# Patient Record
Sex: FEMALE | Race: BLACK OR AFRICAN AMERICAN | Employment: FULL TIME | URBAN - METROPOLITAN AREA
[De-identification: names, ages, dates, MRNs, and addresses within clinical notes are randomized per-mention and may not be internally consistent; named-entity substitution may affect disease eponyms.]

---

## 2020-02-26 ENCOUNTER — HOSPITAL ENCOUNTER (OUTPATIENT)
Dept: GENERAL RADIOLOGY | Age: 40
Discharge: HOME | End: 2020-02-26
Attending: OBSTETRICS & GYNECOLOGY
Payer: COMMERCIAL

## 2020-02-26 DIAGNOSIS — N92.6 IRREGULAR MENSTRUAL CYCLE: ICD-10-CM

## 2020-02-26 PROCEDURE — 74011636320 HC RX REV CODE- 636/320: Performed by: OBSTETRICS & GYNECOLOGY

## 2020-02-26 PROCEDURE — 58340 CATHETER FOR HYSTEROGRAPHY: CPT

## 2020-02-26 RX ADMIN — IOPAMIDOL 25 ML: 612 INJECTION, SOLUTION INTRAVENOUS at 09:57

## 2021-09-05 ENCOUNTER — OUT OF OFFICE VISIT (OUTPATIENT)
Dept: URBAN - METROPOLITAN AREA MEDICAL CENTER 28 | Facility: MEDICAL CENTER | Age: 41
End: 2021-09-05
Payer: COMMERCIAL

## 2021-09-05 DIAGNOSIS — K51.80 CHRONIC PANCOLONIC ULCERATIVE COLITIS: ICD-10-CM

## 2021-09-05 DIAGNOSIS — A04.0 ENTERITIS, ENTEROPATHOGENIC E. COLI: ICD-10-CM

## 2021-09-05 PROCEDURE — 99232 SBSQ HOSP IP/OBS MODERATE 35: CPT | Performed by: INTERNAL MEDICINE

## 2021-09-05 PROCEDURE — 99254 IP/OBS CNSLTJ NEW/EST MOD 60: CPT | Performed by: INTERNAL MEDICINE

## 2021-09-07 ENCOUNTER — OUT OF OFFICE VISIT (OUTPATIENT)
Dept: URBAN - METROPOLITAN AREA MEDICAL CENTER 28 | Facility: MEDICAL CENTER | Age: 41
End: 2021-09-07
Payer: COMMERCIAL

## 2021-09-07 DIAGNOSIS — A04.0 ENTERITIS, ENTEROPATHOGENIC E. COLI: ICD-10-CM

## 2021-09-07 DIAGNOSIS — K51.80 CHRONIC PANCOLONIC ULCERATIVE COLITIS: ICD-10-CM

## 2021-09-07 PROCEDURE — 99232 SBSQ HOSP IP/OBS MODERATE 35: CPT | Performed by: INTERNAL MEDICINE

## 2021-09-08 ENCOUNTER — OUT OF OFFICE VISIT (OUTPATIENT)
Dept: URBAN - METROPOLITAN AREA MEDICAL CENTER 28 | Facility: MEDICAL CENTER | Age: 41
End: 2021-09-08
Payer: COMMERCIAL

## 2021-09-08 DIAGNOSIS — K51.811 OTHER ULCERATIVE COLITIS WITH RECTAL BLEEDING: ICD-10-CM

## 2021-09-08 PROCEDURE — 45331 SIGMOIDOSCOPY AND BIOPSY: CPT | Performed by: INTERNAL MEDICINE

## 2021-09-09 ENCOUNTER — OUT OF OFFICE VISIT (OUTPATIENT)
Dept: URBAN - METROPOLITAN AREA MEDICAL CENTER 28 | Facility: MEDICAL CENTER | Age: 41
End: 2021-09-09
Payer: COMMERCIAL

## 2021-09-09 DIAGNOSIS — K51.80 CHRONIC PANCOLONIC ULCERATIVE COLITIS: ICD-10-CM

## 2021-09-09 DIAGNOSIS — A04.0 ENTERITIS, ENTEROPATHOGENIC E. COLI: ICD-10-CM

## 2021-09-09 PROCEDURE — 99232 SBSQ HOSP IP/OBS MODERATE 35: CPT | Performed by: INTERNAL MEDICINE

## 2021-09-11 ENCOUNTER — OUT OF OFFICE VISIT (OUTPATIENT)
Dept: URBAN - METROPOLITAN AREA MEDICAL CENTER 28 | Facility: MEDICAL CENTER | Age: 41
End: 2021-09-11
Payer: COMMERCIAL

## 2021-09-11 DIAGNOSIS — K51.80 CHRONIC PANCOLONIC ULCERATIVE COLITIS: ICD-10-CM

## 2021-09-11 DIAGNOSIS — A04.0 ENTERITIS, ENTEROPATHOGENIC E. COLI: ICD-10-CM

## 2021-09-11 PROCEDURE — 99232 SBSQ HOSP IP/OBS MODERATE 35: CPT | Performed by: INTERNAL MEDICINE

## 2021-09-14 ENCOUNTER — OUT OF OFFICE VISIT (OUTPATIENT)
Dept: URBAN - METROPOLITAN AREA MEDICAL CENTER 28 | Facility: MEDICAL CENTER | Age: 41
End: 2021-09-14
Payer: COMMERCIAL

## 2021-09-14 DIAGNOSIS — K51.80 CHRONIC PANCOLONIC ULCERATIVE COLITIS: ICD-10-CM

## 2021-09-14 DIAGNOSIS — A04.0 ENTERITIS, ENTEROPATHOGENIC E. COLI: ICD-10-CM

## 2021-09-14 PROCEDURE — 99232 SBSQ HOSP IP/OBS MODERATE 35: CPT | Performed by: INTERNAL MEDICINE

## 2021-09-16 ENCOUNTER — OUT OF OFFICE VISIT (OUTPATIENT)
Dept: URBAN - METROPOLITAN AREA MEDICAL CENTER 28 | Facility: MEDICAL CENTER | Age: 41
End: 2021-09-16
Payer: COMMERCIAL

## 2021-09-16 DIAGNOSIS — R71.0 DECREASED HEMOGLOBIN: ICD-10-CM

## 2021-09-16 DIAGNOSIS — A04.0 ENTERITIS, ENTEROPATHOGENIC E. COLI: ICD-10-CM

## 2021-09-16 PROCEDURE — 99232 SBSQ HOSP IP/OBS MODERATE 35: CPT | Performed by: INTERNAL MEDICINE

## 2021-09-30 ENCOUNTER — OFFICE VISIT (OUTPATIENT)
Dept: URBAN - METROPOLITAN AREA CLINIC 52 | Facility: CLINIC | Age: 41
End: 2021-09-30
Payer: COMMERCIAL

## 2021-09-30 DIAGNOSIS — K51.90 ULCERATIVE COLITIS WITHOUT COMPLICATIONS, UNSPECIFIED LOCATION: ICD-10-CM

## 2021-09-30 PROBLEM — 64766004: Status: ACTIVE | Noted: 2021-09-30

## 2021-09-30 PROCEDURE — 99214 OFFICE O/P EST MOD 30 MIN: CPT | Performed by: INTERNAL MEDICINE

## 2021-09-30 RX ORDER — HYDROCODONE BITARTRATE AND ACETAMINOPHEN 7.5; 325 MG/1; MG/1
1 TABLET AS NEEDED TABLET ORAL
Status: ACTIVE | COMMUNITY

## 2021-09-30 RX ORDER — ONDANSETRON 4 MG/1
1 TABLET ON THE TONGUE AND ALLOW TO DISSOLVE TABLET, ORALLY DISINTEGRATING ORAL
Status: ACTIVE | COMMUNITY

## 2021-09-30 RX ORDER — GABAPENTIN 300 MG/1
1 CAPSULE CAPSULE ORAL TWICE PER DAY
Status: ACTIVE | COMMUNITY

## 2021-09-30 RX ORDER — SULFASALAZINE 500 MG/1
2 TABLET ORAL THREE TIMES A DAY
Qty: 180 | Refills: 6 | OUTPATIENT

## 2021-09-30 RX ORDER — HYDROCODONE BITARTRATE AND ACETAMINOPHEN 5; 325 MG/1; MG/1
1 TABLET AS NEEDED TABLET ORAL
Status: ACTIVE | COMMUNITY

## 2021-09-30 RX ORDER — PREDNISONE 10 MG/1
TAKE 4 PER DAY FOR 1 WEEK, THEN TAKE 3 TABLETS ONCE ON WEEK 2 TABLET ORAL
Status: ACTIVE | COMMUNITY

## 2021-09-30 RX ORDER — PANTOPRAZOLE SODIUM 40 MG/1
1 TABLET TABLET, DELAYED RELEASE ORAL ONCE A DAY
Status: ACTIVE | COMMUNITY

## 2021-09-30 RX ORDER — DICYCLOMINE HYDROCHLORIDE 10 MG/1
1 TABLET CAPSULE ORAL
Status: ACTIVE | COMMUNITY

## 2021-09-30 NOTE — HPI-TODAY'S VISIT:
41 yr old female with history of UC diagnosed in 2011, non compliant with medical therapy for several years. Pt recently hospitalized at [facility] 9/5-9/17/21 with diarrhea and abdominal pain. Stool culture was positive for enteropathogenic E Coli. Pt initially has poor response to levaquin with flagyl. Flexible sigmoidoscopy showed severe colitis of rectosigmoid colon. Biopsies showed active colitis. IV steroids were added to antibiotics and remicade was discussed with pt . Pt was discharged on prednisone 40 mg/day with taper. Pt is on Prednisone 30 mg/day, with instructions to taper it by 10 mg every 2 weeks.  Pt has noted a slight improvement and states that rectal bleeding has resolved now. Diarrhea still present, 4-5 semiliquid BMs daily. Abdominal pain better, still moderate lower abdominal discomfort.

## 2021-10-06 ENCOUNTER — TELEPHONE ENCOUNTER (OUTPATIENT)
Dept: URBAN - METROPOLITAN AREA CLINIC 94 | Facility: CLINIC | Age: 41
End: 2021-10-06

## 2021-10-06 RX ORDER — MESALAMINE 375 MG/1
4 CAPSULES IN THE MORNING CAPSULE, EXTENDED RELEASE ORAL ONCE A DAY
Qty: 120 CAPSULE | Refills: 6 | OUTPATIENT
Start: 2021-10-06 | End: 2022-05-04

## 2021-11-04 ENCOUNTER — OFFICE VISIT (OUTPATIENT)
Dept: URBAN - METROPOLITAN AREA CLINIC 52 | Facility: CLINIC | Age: 41
End: 2021-11-04

## 2023-09-27 ENCOUNTER — LAB OUTSIDE AN ENCOUNTER (OUTPATIENT)
Dept: URBAN - METROPOLITAN AREA CLINIC 88 | Facility: CLINIC | Age: 43
End: 2023-09-27

## 2023-09-27 ENCOUNTER — WEB ENCOUNTER (OUTPATIENT)
Dept: URBAN - METROPOLITAN AREA CLINIC 88 | Facility: CLINIC | Age: 43
End: 2023-09-27

## 2023-09-27 ENCOUNTER — OFFICE VISIT (OUTPATIENT)
Dept: URBAN - METROPOLITAN AREA CLINIC 88 | Facility: CLINIC | Age: 43
End: 2023-09-27
Payer: COMMERCIAL

## 2023-09-27 VITALS
DIASTOLIC BLOOD PRESSURE: 94 MMHG | BODY MASS INDEX: 50.64 KG/M2 | HEIGHT: 63 IN | TEMPERATURE: 97.2 F | SYSTOLIC BLOOD PRESSURE: 139 MMHG | OXYGEN SATURATION: 100 % | WEIGHT: 285.8 LBS | HEART RATE: 94 BPM

## 2023-09-27 DIAGNOSIS — I72.8 SPLENIC ARTERY ANEURYSM: ICD-10-CM

## 2023-09-27 DIAGNOSIS — K51.00 ULCERATIVE PANCOLITIS: ICD-10-CM

## 2023-09-27 DIAGNOSIS — R19.7 DIARRHEA OF PRESUMED INFECTIOUS ORIGIN: ICD-10-CM

## 2023-09-27 DIAGNOSIS — R15.9 FULL INCONTINENCE OF FECES: ICD-10-CM

## 2023-09-27 DIAGNOSIS — R15.2 FECAL URGENCY: ICD-10-CM

## 2023-09-27 DIAGNOSIS — K62.5 RECTAL BLEEDING: ICD-10-CM

## 2023-09-27 PROBLEM — 142251000119102: Status: ACTIVE | Noted: 2023-09-27

## 2023-09-27 PROBLEM — 444548001: Status: ACTIVE | Noted: 2023-09-27

## 2023-09-27 PROCEDURE — 99205 OFFICE O/P NEW HI 60 MIN: CPT | Performed by: NURSE PRACTITIONER

## 2023-09-27 RX ORDER — ONDANSETRON 4 MG/1
1 TABLET ON THE TONGUE AND ALLOW TO DISSOLVE TABLET, ORALLY DISINTEGRATING ORAL
Status: ON HOLD | COMMUNITY

## 2023-09-27 RX ORDER — HYDROCODONE BITARTRATE AND ACETAMINOPHEN 5; 325 MG/1; MG/1
1 TABLET AS NEEDED TABLET ORAL
Status: ON HOLD | COMMUNITY

## 2023-09-27 RX ORDER — SULFASALAZINE 500 MG/1
2 TABLET ORAL THREE TIMES A DAY
Qty: 180 | Refills: 6 | Status: ACTIVE | COMMUNITY

## 2023-09-27 RX ORDER — PANTOPRAZOLE SODIUM 40 MG/1
1 TABLET TABLET, DELAYED RELEASE ORAL ONCE A DAY
Status: ON HOLD | COMMUNITY

## 2023-09-27 RX ORDER — HYDROCODONE BITARTRATE AND ACETAMINOPHEN 7.5; 325 MG/1; MG/1
1 TABLET AS NEEDED TABLET ORAL
Status: ON HOLD | COMMUNITY

## 2023-09-27 RX ORDER — SULFASALAZINE 500 MG/1
TAKE 2 TABLETS TABLET ORAL THREE TIMES A DAY
Qty: 180 | Refills: 6
End: 2024-04-24

## 2023-09-27 RX ORDER — GABAPENTIN 300 MG/1
1 CAPSULE CAPSULE ORAL TWICE PER DAY
Status: ON HOLD | COMMUNITY

## 2023-09-27 RX ORDER — PREDNISONE 10 MG/1
TAKE 4 PER DAY FOR 1 WEEK, THEN TAKE 3 TABLETS ONCE ON WEEK 2 TABLET ORAL
Status: ON HOLD | COMMUNITY

## 2023-09-27 RX ORDER — DICYCLOMINE HYDROCHLORIDE 10 MG/1
1 TABLET CAPSULE ORAL
Status: ON HOLD | COMMUNITY

## 2023-09-27 NOTE — HPI-TODAY'S VISIT:
Patient presents today, along with her , as ER follow up from [facility] on 09/16/2023.  Patient has a hx ulcerative colitis diagnosed in 2011.  Presented to the ER with c/o generalized abdominal pain, rectal bleeding and diarrhea that had been occurring for a week.   States was also experiencing fecal urgency, nocturnal diarrhea, tenesmus and fecal incontinence.  Denies fever, chills, nausea, vomiting, weight loss or loss of appetite.  CTA revealed signs of mild colitis in descending colon, fatty liver, distal splenic artery aneurysm and 4.9 uterine fibroid.  Labs:  WBC 6.50, Hgb 13, MCV 85.6,  and normal LFTs.  Patient was discharged home and instructed to follow up with GI and vascular.   Patient was admitted to [facility] in 2021 for UC flare.  Flex sigmoidoscopy during this admission revealed a continuous area of bleeding with severe ulcerations in rectum and sigmoid colon.  Biologic therapy was discussed but patient states she was uninsured at the time.  Since then has been taking sulfasalazine with episodic flares.  Usually takes 2 sulfasalazine tablets/day until symptoms improve before discontinuing use.  Last colonoscopy was in 2011.     Ulcerative colitis: Diagnosed with ulcerative colitis in 2011.  Presented with c/o rectal bleeding, diarrhea and abdominal pain.  Started on tapering steroids and oral mesalamine therapy.  Patient lost her insurance around 2015.  Experienced a flare of UC in 2021 that lead to hospitalization at [facility] in 2021.  Has been taking sulfasalazine intermittently since then.   Continues to experience intermittently flares about once every 2-3 months.

## 2023-10-01 ENCOUNTER — DASHBOARD ENCOUNTERS (OUTPATIENT)
Age: 43
End: 2023-10-01

## 2023-10-01 PROBLEM — 70405009: Status: ACTIVE | Noted: 2023-10-01

## 2023-10-03 ENCOUNTER — OFFICE VISIT (OUTPATIENT)
Dept: URBAN - METROPOLITAN AREA SURGERY CENTER 24 | Facility: SURGERY CENTER | Age: 43
End: 2023-10-03

## 2023-10-11 ENCOUNTER — TELEPHONE ENCOUNTER (OUTPATIENT)
Dept: URBAN - METROPOLITAN AREA CLINIC 70 | Facility: CLINIC | Age: 43
End: 2023-10-11

## 2023-10-11 ENCOUNTER — LAB OUTSIDE AN ENCOUNTER (OUTPATIENT)
Dept: URBAN - METROPOLITAN AREA CLINIC 70 | Facility: CLINIC | Age: 43
End: 2023-10-11

## 2023-10-11 ENCOUNTER — OFFICE VISIT (OUTPATIENT)
Dept: URBAN - METROPOLITAN AREA SURGERY CENTER 24 | Facility: SURGERY CENTER | Age: 43
End: 2023-10-11

## 2023-10-11 PROBLEM — 444548001: Status: ACTIVE | Noted: 2023-10-11

## 2023-12-07 ENCOUNTER — OFFICE VISIT (OUTPATIENT)
Dept: URBAN - METROPOLITAN AREA MEDICAL CENTER 42 | Facility: MEDICAL CENTER | Age: 43
End: 2023-12-07

## 2025-07-03 ENCOUNTER — OFFICE VISIT (OUTPATIENT)
Dept: URBAN - METROPOLITAN AREA CLINIC 118 | Facility: CLINIC | Age: 45
End: 2025-07-03

## 2025-07-29 ENCOUNTER — TELEPHONE ENCOUNTER (OUTPATIENT)
Dept: URBAN - METROPOLITAN AREA CLINIC 118 | Facility: CLINIC | Age: 45
End: 2025-07-29

## 2025-07-29 ENCOUNTER — OFFICE VISIT (OUTPATIENT)
Dept: URBAN - METROPOLITAN AREA CLINIC 118 | Facility: CLINIC | Age: 45
End: 2025-07-29
Payer: COMMERCIAL

## 2025-07-29 DIAGNOSIS — K62.5 RECTAL BLEEDING: ICD-10-CM

## 2025-07-29 DIAGNOSIS — K51.90 ULCERATIVE COLITIS, UNSPECIFIED: ICD-10-CM

## 2025-07-29 DIAGNOSIS — R19.7 CHRONIC DIARRHEA: ICD-10-CM

## 2025-07-29 PROCEDURE — 99214 OFFICE O/P EST MOD 30 MIN: CPT | Performed by: INTERNAL MEDICINE

## 2025-07-29 RX ORDER — PANTOPRAZOLE SODIUM 40 MG/1
1 TABLET TABLET, DELAYED RELEASE ORAL ONCE A DAY
Status: ON HOLD | COMMUNITY

## 2025-07-29 RX ORDER — SEMAGLUTIDE 1.34 MG/ML
AS DIRECTED INJECTION, SOLUTION SUBCUTANEOUS
Status: ACTIVE | COMMUNITY

## 2025-07-29 NOTE — PHYSICAL EXAM GASTROINTESTINAL
Abdomen , soft, diffuse abdominal tenderness, mild distention , no guarding or rigidity , no masses palpable , normal bowel sounds, rectal deferred

## 2025-07-29 NOTE — HPI-TODAY'S VISIT:
45-year-old female with history of ulcerative colitis presenting for evaluation of hematochezia.  She has had on and off care over the years was initially diagnosed in 2011 and was taking sulfasalazine and mesalamine.  Over the years was able to maintain symptoms with diet however admits to poor follow-up due to insurance limitations.  Symptoms have worsened over the last year with diffuse diarrhea upwards of 20+ times a day, episodes of hematochezia nearly daily, diffuse abdominal pain, tenasmus, nocturnal symptoms, poor appetite, 40 pound weight loss unintentionally.  She went to the ED a couple months ago, per outside record review CBC, CMP was unremarkable, CTA abdomen and pelvis revealed circumferential wall thickening from the proximal transverse to the rectum with adjacent pericolonic inflammation noted.  Patient was given a course of prednisone without much improvement.  Per chart review, she as at [facility] in 2021 for UC flare. Flex sigmoidoscopy during this admission revealed a continuous area of bleeding with severe ulcerations in rectum and sigmoid colon.

## 2025-08-04 ENCOUNTER — OFFICE VISIT (OUTPATIENT)
Dept: URBAN - METROPOLITAN AREA SURGERY CENTER 23 | Facility: SURGERY CENTER | Age: 45
End: 2025-08-04
Payer: COMMERCIAL

## 2025-08-04 ENCOUNTER — TELEPHONE ENCOUNTER (OUTPATIENT)
Dept: URBAN - METROPOLITAN AREA CLINIC 94 | Facility: CLINIC | Age: 45
End: 2025-08-04

## 2025-08-04 DIAGNOSIS — K51.00 ACUTE ULCERATIVE PANCOLITIS: ICD-10-CM

## 2025-08-04 DIAGNOSIS — K51.911 ULCERATIVE COLITIS WITH RECTAL BLEEDING, UNSPECIFIED LOCATION: ICD-10-CM

## 2025-08-04 PROCEDURE — 45380 COLONOSCOPY AND BIOPSY: CPT | Performed by: INTERNAL MEDICINE

## 2025-08-04 PROCEDURE — 00811 ANES LWR INTST NDSC NOS: CPT | Performed by: NURSE ANESTHETIST, CERTIFIED REGISTERED

## 2025-08-04 RX ORDER — PANTOPRAZOLE SODIUM 40 MG/1
1 TABLET TABLET, DELAYED RELEASE ORAL ONCE A DAY
Status: ON HOLD | COMMUNITY

## 2025-08-04 RX ORDER — SEMAGLUTIDE 1.34 MG/ML
AS DIRECTED INJECTION, SOLUTION SUBCUTANEOUS
Status: ACTIVE | COMMUNITY

## 2025-08-04 RX ORDER — PREDNISONE 10 MG/1
AS DIRECTED TABLET ORAL
Qty: 70 | Refills: 0 | OUTPATIENT
Start: 2025-08-04